# Patient Record
Sex: FEMALE | Employment: FULL TIME | URBAN - METROPOLITAN AREA
[De-identification: names, ages, dates, MRNs, and addresses within clinical notes are randomized per-mention and may not be internally consistent; named-entity substitution may affect disease eponyms.]

---

## 2021-05-13 ENCOUNTER — HOSPITAL ENCOUNTER (OUTPATIENT)
Dept: PHYSICAL THERAPY | Age: 41
Discharge: HOME | End: 2021-05-13
Payer: COMMERCIAL

## 2021-05-13 PROCEDURE — 97162 PT EVAL MOD COMPLEX 30 MIN: CPT

## 2021-05-13 PROCEDURE — 97112 NEUROMUSCULAR REEDUCATION: CPT
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
## 2021-05-18 ENCOUNTER — HOSPITAL ENCOUNTER (OUTPATIENT)
Dept: PHYSICAL THERAPY | Age: 41
Discharge: HOME | End: 2021-05-18
Payer: COMMERCIAL

## 2021-05-18 PROCEDURE — 97110 THERAPEUTIC EXERCISES: CPT

## 2021-05-18 NOTE — PROGRESS NOTES
PHYSICAL THERAPY - DAILY TREATMENT NOTE    Patient Name: Delaney Vuong        Date: 2021  : 1980   yes Patient  Verified  Visit #:   2   of   12  Insurance: Payor: Ish Fernandez / Plan: Chapito Verma RPN / Product Type: Commerical /      In time: 2:45 Out time: 3:33   Total Treatment Time: 48     Medicare/[facility] Time Tracking (below)   Total Timed Codes (min):  n/a 1:1 Treatment Time:  n/a     TREATMENT AREA =  Neck pain [M54.2]  Pain in left shoulder [M25.512]    SUBJECTIVE  Pain Level (on 0 to 10 scale):   10   Medication Changes/New allergies or changes in medical history, any new surgeries or procedures?    no  If yes, update Summary List   Subjective Functional Status/Changes:  []  No changes reported     Patient reports not popping her neck since initial eval. States that she still has difficulty lifting her L arm up over head. OBJECTIVE    48 min Therapeutic Exercise:  [x]  See flow sheet   Rationale:      increase ROM and increase strength to improve the patients ability to perform functional ADLs and household activities with reduced c/o symptoms. Billed With/As:   [x] TE   [] TA   [] Neuro   [] Self Care Patient Education: [x] Review HEP    [] Progressed/Changed HEP based on:   [] positioning   [] body mechanics   [] transfers   [] heat/ice application    [x] other: [employer] Access Code [email or web address]     Other Objective/Functional Measures:    Initiated PT session with UBE followed by standing exercises. Progressed therex per POC (see flowsheet) to improve neck and L shoulder mobility and strength. Req'd cues 100 % of therex for proper form/technique due to 1st F/U appt. Issued additional exercises to HEP to improve spinal mobility and postural mms strength.    Exercises  Seated Thoracic Lumbar Extension with Pectoralis Stretch - 2-3 x daily - 7 x weekly - 15 reps - 5 hold  Seated Scapular Retraction - 1-2 x daily - 5-7 x weekly - 10 reps - 5 hold       Post Treatment Pain Level (on 0 to 10) scale:   2  / 10     ASSESSMENT  Assessment/Changes in Function:     Patient quick to fatigue with strengthening exercises otherwise noted no sharp pain or red flags following PT session. Demo good tolerance with initial f/u indicated by reduced pain levels pre and post tx session     []  See Progress Note/Recertification   Patient will continue to benefit from skilled PT services to modify and progress therapeutic interventions, address functional mobility deficits, address ROM deficits, address strength deficits, analyze and address soft tissue restrictions, analyze and cue movement patterns, analyze and modify body mechanics/ergonomics and assess and modify postural abnormalities to attain remaining goals. Progress toward goals / Updated goals: · Short Term Goals: To be accomplished in  3  weeks:  1. Patient will be independent and compliant with initial HEP. Progressing 05/18; reports HEP compliance  2. Patient will report decreased pain levels to 0/10 in order to sleep through the night pain-free. Progressing 05/18 indicated by pre vs post tx pain levels  3. Demonstrate improved L shoulder PROM to full and pain free in order to prevent exacerbation of symptoms. · Long Term Goals: To be accomplished in  6  weeks:  1. Patient will be independent and compliant with progressive HEP in order to maintain gains made with physical therapy  2. Improve FOTO score from 56 to > or = 64 in order to indicate improved ease with ADLs. 3. Demonstrate improved LUE strength to 4+/5 to improve patients ability to resume cooking and cleaning demands at home. 4. Report ability to [facility] KATIE improved posture for full workday without return of symptoms in order to return to [facility]. 5.       PLAN  [x]  Upgrade activities as tolerated yes Continue plan of care   []  Discharge due to :    []  Other:      Therapist: MARCEL Fields    Date: 5/18/2021 Time: 4:46 PM     Future Appointments   Date Time Provider Luisa Bakeri   5/18/2021  2:45 PM Percy Smart [address]   5/20/2021  3:30 PM Byron Ok,  [facility] [address]   5/25/2021  3:30 PM Byron Ok,  [facility] [address]   5/27/2021  3:30 PM Byron Ok,  [facility] [address]   6/1/2021  3:30 PM Byron Ok,  [facility] [address]   6/3/2021  3:30 PM Byron Ok,  [facility] [address]   6/8/2021  3:30 PM Byron Ok,  [facility] [address]   6/10/2021  3:30 PM Byron Ok,  [facility] [address]   6/15/2021  3:30 PM Byron Ok,  [facility] [address]   6/17/2021  3:30 PM Byron Ok,  [facility] [address]   6/22/2021  3:30 PM Byron Ok,  [facility] [address]   6/24/2021  3:30 PM Byron Ok,  [facility] [address]   6/29/2021  3:30 PM Byron Ok, [address]

## 2021-05-20 ENCOUNTER — HOSPITAL ENCOUNTER (OUTPATIENT)
Dept: PHYSICAL THERAPY | Age: 41
Discharge: HOME | End: 2021-05-20
Payer: COMMERCIAL

## 2021-05-20 PROCEDURE — 97110 THERAPEUTIC EXERCISES: CPT

## 2021-05-20 PROCEDURE — 97112 NEUROMUSCULAR REEDUCATION: CPT

## 2021-05-20 NOTE — PROGRESS NOTES
PHYSICAL THERAPY - DAILY TREATMENT NOTE    Patient Name: Armando Steiner        Date: 2021  : 1980   YES Patient  Verified  Visit #:   3   of   12  Insurance: Payor: Emili Pata / Plan: [employer] [place] RPN / Product Type: Commerical /      In time: 330 Out time: 420   Total Treatment Time: 50     BCBS/Medicare Time Tracking (below)   Total Timed Codes (min):  NA 1:1 Treatment Time:  NA     TREATMENT AREA =  Neck pain [M54.2]  Pain in left shoulder [M25.512]    SUBJECTIVE  Pain Level (on 0 to 10 scale):  3  / 10   Medication Changes/New allergies or changes in medical history, any new surgeries or procedures? NO    If yes, update Summary List   Subjective Functional Status/Changes:  []  No changes reported     I don't have any pain but I feel so stiff and achy after last time. I tried heat and some stretches and they helped but not much            30 min Therapeutic Exercise:  [x]  See flow sheet   Rationale:      increase ROM, increase strength and improve coordination to improve the patients ability to resume OH activity     20 min Neuromuscular Re-ed: [x]  See flow sheet   Rationale:    increase ROM, increase strength, improve coordination and increase proprioception to improve the patients ability to improve resting posture    Billed With/As:   [x] TE   [] TA   [] Neuro   [] Self Care Patient Education: [x] Review HEP    [] Progressed/Changed HEP based on:   [] positioning   [] body mechanics   [] transfers   [] heat/ice application    [] other:      Other Objective/Functional Measures:    TE per FS added multiple exercises for postural control and RC endurance. Post Treatment Pain Level (on 0 to 10) scale:   1  / 10     ASSESSMENT  Assessment/Changes in Function:     Improvements in scapular stability and good tolerance to stretches to cervicothoracic spine. Pt demonstrates improved mobility and ability to achieve therex positions without increase in symptoms.       []  See Progress Note/Recertification   Patient will continue to benefit from skilled PT services to modify and progress therapeutic interventions, address functional mobility deficits, address ROM deficits, address strength deficits, analyze and address soft tissue restrictions, analyze and cue movement patterns, analyze and modify body mechanics/ergonomics and assess and modify postural abnormalities to attain remaining goals. Progress toward goals / Updated goals:    Progressing towards STG #3.      PLAN  [x]  Upgrade activities as tolerated YES Continue plan of care   []  Discharge due to :    []  Other:      Therapist: Wilfred Silva DPT    Date: 5/20/2021 Time: 3:34 PM     Future Appointments   Date Time Provider Luisa Montaño   5/25/2021  3:30 PM Nidia Purl, PT [facility] [address]   5/27/2021  3:30 PM Nidia Purl, PT [facility] [address]   6/1/2021  3:30 PM Nidia Purl, PT [facility] [address]   6/3/2021  3:30 PM Nidia Purl, PT [facility] [address]   6/8/2021  3:30 PM Nidia Purl, PT [facility] [address]   6/10/2021  3:30 PM Nidia Purl, PT [facility] [address]   6/15/2021  3:30 PM Nidia Purl, PT [facility] [address]   6/17/2021  3:30 PM Nidia Purl, PT [facility] [address]   6/22/2021  3:30 PM Nidia Purl, PT [facility] [address]   6/24/2021  3:30 PM Nidia Purl, PT [facility] [address]   6/29/2021  3:30 PM Nidia Purl, [address] [place] [address] [address]

## 2021-05-25 ENCOUNTER — HOSPITAL ENCOUNTER (OUTPATIENT)
Dept: PHYSICAL THERAPY | Age: 41
Discharge: HOME | End: 2021-05-25
Payer: COMMERCIAL

## 2021-05-25 PROCEDURE — 97112 NEUROMUSCULAR REEDUCATION: CPT

## 2021-05-25 PROCEDURE — 97110 THERAPEUTIC EXERCISES: CPT

## 2021-05-25 NOTE — PROGRESS NOTES
PHYSICAL THERAPY - DAILY TREATMENT NOTE    Patient Name: Rodney Patten        Date: 2021  : 1980   YES Patient  Verified  Visit #:      12  Insurance: Payor: Franklyn Freeman / Plan: Darren Vivas RPN / Product Type: Commerical /      In time: 330 Out time: 430   Total Treatment Time: 55     BCBS/Medicare Time Tracking (below)   Total Timed Codes (min):  NA 1:1 Treatment Time:  NA     TREATMENT AREA =  Neck pain [M54.2]  Pain in left shoulder [M25.512]    SUBJECTIVE  Pain Level (on 0 to 10 scale):  3  / 10   Medication Changes/New allergies or changes in medical history, any new surgeries or procedures? NO    If yes, update Summary List   Subjective Functional Status/Changes:  []  No changes reported     I have been feeling so much better/looser. I am sleeping better and am not waking due to pain. Im hurting a little because my dad had surgery yesterday so I was up a lot and lifting            30 min Therapeutic Exercise:  [x]  See flow sheet   Rationale:      increase ROM, increase strength, improve coordination, improve balance and increase proprioception to improve the patients ability to improve resting posture     15 min Neuromuscular Re-ed: [x]  See flow sheet   Rationale:    increase ROM, increase strength, improve coordination and increase proprioception to improve the patients ability to maintain improved posture for prolonged periods. Billed With/As:   [x] TE   [] TA   [] Neuro   [] Self Care Patient Education: [x] Review HEP    [] Progressed/Changed HEP based on:   [] positioning   [] body mechanics   [] transfers   [] heat/ice application    [] other:      Other Objective/Functional Measures:    Resumed Tband rows    Added book openers and dowel flexion/ER to HEP     Post Treatment Pain Level (on 0 to 10) scale:   0  / 10     ASSESSMENT  Assessment/Changes in Function:     Patient demosntrates significant improvements with shoulder and thoracic motion.  Pt states since initiating thoracic mobility she has experienced less headaches and is able to sleep through the night. Reviewed extensively sleeping position in order to avoid prolonged exacerbating positions. []  See Progress Note/Recertification   Patient will continue to benefit from skilled PT services to modify and progress therapeutic interventions, address functional mobility deficits, address ROM deficits, address strength deficits, analyze and address soft tissue restrictions, analyze and cue movement patterns, analyze and modify body mechanics/ergonomics and assess and modify postural abnormalities to attain remaining goals. Progress toward goals / Updated goals:    Progressing towards STG #3.      PLAN  [x]  Upgrade activities as tolerated YES Continue plan of care   []  Discharge due to :    []  Other:      Therapist: Caitlyn Jauregui DPT    Date: 5/25/2021 Time: 3:43 PM     Future Appointments   Date Time Provider Luisa Montaño   5/27/2021  3:30 PM Luz Salguero, PT [facility]   6/1/2021  3:30 PM Luz Salguero PT [facility]   6/3/2021  3:30 PM Luz Salguero PT [facility]   6/7/2021  3:00 PM Grover Jorge [facility]   6/9/2021  3:45 PM [facility]   6/14/2021  3:45 PM [facility]   6/16/2021  3:00 PM Grover Jorge [facility]   6/21/2021  3:[facility]   6/23/2021  3:45 PM [facility]   6/28/2021  3:00 PM Grover Jorge [facility]   6/30/2021  3:45 PM [facility]

## 2021-05-27 ENCOUNTER — HOSPITAL ENCOUNTER (OUTPATIENT)
Dept: PHYSICAL THERAPY | Age: 41
Discharge: HOME | End: 2021-05-27
Payer: COMMERCIAL

## 2021-05-27 PROCEDURE — 97110 THERAPEUTIC EXERCISES: CPT

## 2021-05-27 PROCEDURE — 97112 NEUROMUSCULAR REEDUCATION: CPT

## 2021-05-27 NOTE — PROGRESS NOTES
PHYSICAL THERAPY - DAILY TREATMENT NOTE    Patient Name: Delaney Vuong        Date: 2021  : 1980   YES Patient  Verified  Visit #:      of   12  Insurance: Payor: Ish Fernandez / Plan: Chapito Verma RPN / Product Type: Commerical /      In time: 330 Out time: 420   Total Treatment Time: 50     BCBS/Medicare Time Tracking (below)   Total Timed Codes (min):  50 1:1 Treatment Time:  50     TREATMENT AREA =  Neck pain [M54.2]  Pain in left shoulder [M25.512]    SUBJECTIVE  Pain Level (on 0 to 10 scale):  0  / 10   Medication Changes/New allergies or changes in medical history, any new surgeries or procedures? NO    If yes, update Summary List   Subjective Functional Status/Changes:  []  No changes reported     I feel good today, no pain and I was able to get my hand behind my head with no pain           Modalities Rationale:     decrease edema, decrease inflammation, and decrease pain to improve patient's ability to perform pain-free ADLs.     min [] Estim, type/location:                                      []  att     []  unatt     []  w/US     []  w/ice    []  w/heat    min []  Mechanical Traction: type/lbs                   []  pro   []  sup   []  int   []  cont    []  before manual    []  after manual    min []  Ultrasound, settings/location:      min []  Iontophoresis w/ dexamethasone, location:                                               []  take home patch       []  in clinic   PD min []  Ice     []  Heat    location/position:     min []  Vasopneumatic Device, press/temp:     min []  Other:    [] Skin assessment post-treatment (if applicable):    []  intact    []  redness- no adverse reaction     []redness  adverse reaction:        30 min Therapeutic Exercise:  [x]  See flow sheet   Rationale:      increase ROM, increase strength, improve coordination and improve balance to improve the patients ability to improve resting posture     20 min Neuromuscular Re-ed: [x]  See flow sheet   Rationale: increase ROM, increase strength, improve coordination and improve balance to improve the patients ability to maintain improved posture for prolonged periods. Billed With/As:   [x] TE   [] TA   [] Neuro   [] Self Care Patient Education: [x] Review HEP    [] Progressed/Changed HEP based on:   [] positioning   [] body mechanics   [] transfers   [] heat/ice application    [] other:      Other Objective/Functional Measures:    See POC     Post Treatment Pain Level (on 0 to 10) scale:   0  / 10     ASSESSMENT  Assessment/Changes in Function:     See POC     [x]  See Progress Note/Recertification   Patient will continue to benefit from skilled PT services to modify and progress therapeutic interventions, address functional mobility deficits, address ROM deficits, address strength deficits, analyze and address soft tissue restrictions, analyze and cue movement patterns, analyze and modify body mechanics/ergonomics and assess and modify postural abnormalities to attain remaining goals.    Progress toward goals / Updated goals:    See POC for progress towards goals     PLAN  [x]  Upgrade activities as tolerated YES Continue plan of care   []  Discharge due to :    []  Other:      Therapist: Lisa Hood DPT    Date: 5/27/2021 Time: 5:37 PM     Future Appointments   Date Time Provider Luisa Montaño   6/1/2021  3:30 PM Lon Falcon, PT [facility]   6/3/2021  3:30 PM Lon Falcon, PT [facility]   6/7/2021  3:00 PM Tanvi Dominguez [facility]   6/9/2021  3:45 PM [facility]   6/14/2021  3:45 PM Booker [facility]   6/16/2021  3:00 PM Tanvi Dominguez [facility]   6/21/2021  3:45 PM Booker [facility]   6/23/2021  3:45 PM [facility]   6/28/2021  3:00 PM Tanvi Dominguez [facility]   6/30/2021  3:45 PM Booker Rodriguez [facility]

## 2021-05-27 NOTE — PROGRESS NOTES
[address] [facility] [address] THERAPY   [facility], [address], [place], [address] [facility] - Phone: [phone]  Fax: [phone]  PROGRESS NOTE  Patient Name: Jony Kern : 1980   Treatment/Medical Diagnosis: Neck pain [M54.2]  Pain in left shoulder [M25.512]   Referral Source: Azalia Recio MD     Date of Initial Visit: 5/3/2021 Attended Visits: 5 Missed Visits: 0     SUMMARY OF TREATMENT  Therex including scapular stabilization training, strenghthening of LUE, capsular stretches, neuromuscular control of cervical musculature, AROM and stretching of cervicothoracic spine, postural education, modalities for pain control    CURRENT STATUS  Ms. Oksana Hwang has been seen for 4 follow up visits and is making gradual progress with ROM and pain levels. Although spinal range remains limited, pt is demonstrating significant improvements with postural control and reports dec pain throughout workday and less popping of cervical spine. Shoulder ROM remains limited and painful arc is still present, but patient is able to improve range with cues for correct scapulohumeral rhythm and RC musculature activation. See below for objective measures:    Goal/Measure of Progress Goal Met? 1. Patient will be independent and compliant with initial HEP. Status at last Eval: - Current Status: Independent and compliant 4-5 days/week yes   2. Patient will report decreased pain levels to 0/10 in order to sleep through the night pain-free. Status at last Eval: 0/10 pain currently, and 5/10 pain at worst  Current Status: 0/10 at best  2/10 at worst yes   3. Demonstrate improved L shoulder PROM to full and pain free in order to prevent exacerbation of symptoms. Status at last Eval: 90 deg flex p!,   78 deg abd p!,   fxl IR T10,   fxl ER T1 Current Status: 135 deg flex ,   128 deg abd,   45 deg ER @45 deg abd Progressing     New Goals to be achieved in __3-4__  weeks:  1.   Patient will be independent and compliant with progressive HEP in order to maintain gains made with physical therapy   2. Improve FOTO score from 56 to > or = 64 in order to indicate improved ease with ADLs. 3.  Demonstrate improved LUE strength to 4+/5 to improve patients ability to resume cooking and cleaning demands at home. 4.  Report ability to [facility] KATIE improved posture for full workday without return of symptoms in order to return to PLOF. RECOMMENDATIONS  Continue with skilled Pt services 1-2x/week for 3-4 weeks in order to meet above goals and return to PLOF. If you have any questions/comments please contact us directly at [phone]. Thank you for allowing us to assist in the care of your patient. Therapist Signature: Severiano Generous, DPT Date: 5/27/2021     Time: 5:37 PM   NOTE TO PHYSICIAN:  PLEASE COMPLETE THE ORDERS BELOW AND FAX TO   [facility]: [phone]. If you are unable to process this request in 24 hours please contact our office: [phone].    ___ I have read the above report and request that my patient continue as recommended.   ___ I have read the above report and request that my patient continue therapy with the following changes/special instructions:_________________________________________________________   ___ I have read the above report and request that my patient be discharged from therapy.      Physician Signature:        Date:       Time:

## 2021-06-01 ENCOUNTER — APPOINTMENT (OUTPATIENT)
Dept: PHYSICAL THERAPY | Age: 41
End: 2021-06-01
Payer: COMMERCIAL

## 2021-06-03 ENCOUNTER — APPOINTMENT (OUTPATIENT)
Dept: PHYSICAL THERAPY | Age: 41
End: 2021-06-03
Payer: COMMERCIAL

## 2021-06-07 ENCOUNTER — HOSPITAL ENCOUNTER (OUTPATIENT)
Dept: PHYSICAL THERAPY | Age: 41
Discharge: HOME | End: 2021-06-07
Payer: COMMERCIAL

## 2021-06-07 PROCEDURE — 97110 THERAPEUTIC EXERCISES: CPT

## 2021-06-07 PROCEDURE — 97140 MANUAL THERAPY 1/> REGIONS: CPT

## 2021-06-07 NOTE — PROGRESS NOTES
PHYSICAL THERAPY - DAILY TREATMENT NOTE    Patient Name: Farhad Pump        Date: 2021  : 1980   yes Patient  Verified  Visit #:   6   of   9  Insurance: Payor: Vinnie Ramey / Plan: Cleotha Denver RPN / Product Type: Commerical /      In time: 3:00 Out time: 4:12   Total Treatment Time: 72     Medicare/BCBS Time Tracking (below)   Total Timed Codes (min):  n/a 1:1 Treatment Time:  n/a     TREATMENT AREA =  Neck pain [M54.2]  Pain in left shoulder [M25.512]    SUBJECTIVE  Pain Level (on 0 to 10 scale): 6 / 10   Medication Changes/New allergies or changes in medical history, any new surgeries or procedures?    no  If yes, update Summary List   Subjective Functional Status/Changes:  []  No changes reported     Patient reports trying to put a towel around her body and ended up hearing a \"pop\" in the anterior shoulder. Demonstrating excessive horizontal abduction. OBJECTIVE    Modalities Rationale:     decrease inflammation and decrease pain to improve patient's c/o muscle soreness.    min [] Estim, type/location:                                      []  att     []  unatt     []  w/US     []  w/ice    []  w/heat    min []  Mechanical Traction: type/lbs                   []  pro   []  sup   []  int   []  cont    []  before manual    []  after manual    min []  Ultrasound, settings/location:      min []  Iontophoresis w/ dexamethasone, location:                                               []  take home patch       []  in clinic   10 Min [x]  Ice     []  Heat    Location/position: To L shoulder/neck in supine post-session    min []  Vasopneumatic Device, press/temp:    If using vaso (only need to measure limb vaso being performed on)      pre-treatment girth :       post-treatment girth :       measured at (landmark location) :      min []  Other:    [x] Skin assessment post-treatment (if applicable):    [x]  intact    []  redness- no adverse reaction                  []redness  adverse reaction:        47 min Therapeutic Exercise:  [x]  See flow sheet   Rationale:      increase ROM and increase strength to improve the patients ability to perform functional ADLs and household activities with reduced c/o symptoms. 15 min Manual Therapy: STM/MFR to L ant/post capsule, L pec; L GHJ post/inf mobs in supine   Rationale:      decrease pain, increase ROM, increase tissue extensibility and decrease trigger points to improve patient's ability to perform functional ADLs   The manual therapy interventions were performed at a separate and distinct time from the therapeutic activities interventions. Billed With/As:   [x] TE   [] TA   [] Neuro   [] Self Care Patient Education: [x] Review HEP    [] Progressed/Changed HEP based on:   [] positioning   [] body mechanics   [] transfers   [] heat/ice application    [x] other: [employer] Access Code [facility]     Other Objective/Functional Measures:    *educated pt on use of Theracane and patient return demonstrated technique. *increase tenderness along LHB and anterior capsule. Mild TrPs in post cuff. Post Treatment Pain Level (on 0 to 10) scale:   0  / 10     ASSESSMENT  Assessment/Changes in Function:     Patient noted no pain following PT session. Patient's c/o pain along with subjective statement, patient could have strained anterior shoulder. No significant sign of injury. Would continue to benefit from skilled PT to continue to improve L shoulder strength and mobility     []  See Progress Note/Recertification   Patient will continue to benefit from skilled PT services to modify and progress therapeutic interventions, address functional mobility deficits, address ROM deficits, address strength deficits, analyze and address soft tissue restrictions, analyze and cue movement patterns, analyze and modify body mechanics/ergonomics and assess and modify postural abnormalities to attain remaining goals.    Progress toward goals / Updated goals:    New Goals to be achieved in __3-4__  weeks:  1. Patient will be independent and compliant with progressive HEP in order to maintain gains made with physical therapy   2. Improve FOTO score from 56 to > or = 64 in order to indicate improved ease with ADLs. 3.  Demonstrate improved LUE strength to 4+/5 to improve patients ability to resume cooking and cleaning demands at home.    4.  Report ability to maintian improved posture for full workday without return of symptoms in order to return to [facility].      No significant progress towards PT goals     PLAN  [x]  Upgrade activities as tolerated yes Continue plan of care   []  Discharge due to :    []  Other:      Therapist: Gustavo Moss    Date: 6/7/2021 Time: 4:44 PM     Future Appointments   Date Time Provider Luisa Montaño   6/7/2021  3:00 PM Lorice Bjornstad [facility]   6/9/2021  3:45 PM Lorice Bjornstad [facility]   6/14/2021  3:45 PM Lorice Bjornstad [facility]   6/16/2021  3:00 PM [facility]   6/21/2021  3:45 PM [facility]   6/23/2021  3:45 PM Lorice Bjornstad [facility]   6/28/2021  3:00 PM Lorice Bjornstad [facility]   6/30/2021  3:45 PM Bari Short

## 2021-06-08 ENCOUNTER — APPOINTMENT (OUTPATIENT)
Dept: PHYSICAL THERAPY | Age: 41
End: 2021-06-08
Payer: COMMERCIAL

## 2021-06-09 ENCOUNTER — HOSPITAL ENCOUNTER (OUTPATIENT)
Dept: PHYSICAL THERAPY | Age: 41
Discharge: HOME | End: 2021-06-09
Payer: COMMERCIAL

## 2021-06-09 PROCEDURE — 97110 THERAPEUTIC EXERCISES: CPT

## 2021-06-09 PROCEDURE — 97530 THERAPEUTIC ACTIVITIES: CPT

## 2021-06-09 NOTE — PROGRESS NOTES
PHYSICAL THERAPY - DAILY TREATMENT NOTE    Patient Name: Savanna Garcia        Date: 2021  : 1980   yes Patient  Verified  Visit #:     Insurance: Payor: Faraz De Jesus / Plan: Leonides Meckel RPN / Product Type: Commerical /      In time: 3:44 Out time: 4:44   Total Treatment Time: 60     Medicare/BCBS Time Tracking (below)   Total Timed Codes (min):  n/a 1:1 Treatment Time:  n/a     TREATMENT AREA =  Neck pain [M54.2]  Pain in left shoulder [M25.512]    SUBJECTIVE  Pain Level (on 0 to 10 scale): 3 / 10   Medication Changes/New allergies or changes in medical history, any new surgeries or procedures?    no  If yes, update Summary List   Subjective Functional Status/Changes:  []  No changes reported     Patient reports not feeling too bad today. States that she feels better than LV        OBJECTIVE    Modalities Rationale:     decrease inflammation and decrease pain to improve patient's c/o muscle soreness.    min [] Estim, type/location:                                      []  att     []  unatt     []  w/US     []  w/ice    []  w/heat    min []  Mechanical Traction: type/lbs                   []  pro   []  sup   []  int   []  cont    []  before manual    []  after manual    min []  Ultrasound, settings/location:      min []  Iontophoresis w/ dexamethasone, location:                                               []  take home patch       []  in clinic   10 Min [x]  Ice     []  Heat    Location/position: To L shoulder/neck in supine post-session    min []  Vasopneumatic Device, press/temp:    If using vaso (only need to measure limb vaso being performed on)      pre-treatment girth :       post-treatment girth :       measured at (landmark location) :      min []  Other:    [x] Skin assessment post-treatment (if applicable):    [x]  intact    []  redness- no adverse reaction                  []redness  adverse reaction:        35 min Therapeutic Exercise:  [x]  See flow sheet   Rationale:      increase ROM and increase strength to improve the patients ability to perform functional ADLs and household activities with reduced c/o symptoms. 15 min Therapeutic Activity: [x]  See flow sheet   Rationale:    increase strength and improve coordination to improve the patients ability to tolerate lifting/carrying activities with decrease c/o symptoms. Billed With/As:   [x] TE   [] TA   [] Neuro   [] Self Care Patient Education: [x] Review HEP    [] Progressed/Changed HEP based on:   [] positioning   [] body mechanics   [] transfers   [] heat/ice application    [x] other: "[employer] Access Code [facility]     Other Objective/Functional Measures:    Access Code: [email or web address]  URL: [email or web address]  Date: 06/09/2021  Prepared by: Santy Wilder    Program Notes  PERFORM ALL EXERCISES TO YOUR TOLERANCE. IF SHARP PAIN  OR RED FLAGS OCCUR, IMMEDIATELY STOP EXERCISE. Exercises  Sidelying Shoulder External Rotation - 1 x daily - 4-5 x weekly - 2 sets - 10 reps  Sidelying Shoulder Abduction - 1 x daily - 4-5 x weekly - 2 sets - 10 reps  Quadruped Alternating Arm Lift - 1 x daily - 4-5 x weekly - 3 sets - 5 reps  Plank on Counter - 1 x daily - 4-5 x weekly - 5 reps - 10 hold    *added multiple shoulder/scapular strengthening exercises (see flowsheet)     Post Treatment Pain Level (on 0 to 10) scale:   0  / 10     ASSESSMENT  Assessment/Changes in Function:     Patient noted no pain following PT session. Able to maintain good shoulder positioning during PREs and noted no discomfort in the L shoulder.       []  See Progress Note/Recertification   Patient will continue to benefit from skilled PT services to modify and progress therapeutic interventions, address functional mobility deficits, address ROM deficits, address strength deficits, analyze and address soft tissue restrictions, analyze and cue movement patterns, analyze and modify body mechanics/ergonomics and assess and modify postural abnormalities to attain remaining goals. Progress toward goals / Updated goals:    New Goals to be achieved in __3-4__  weeks:  1. Patient will be independent and compliant with progressive HEP in order to maintain gains made with physical therapy   2. Improve FOTO score from 56 to > or = 64 in order to indicate improved ease with ADLs. 3.  Demonstrate improved LUE strength to 4+/5 to improve patients ability to resume cooking and cleaning demands at home. Progressing 06/09 indicated by ability to complete PREs   4.   Report ability to maintian improved posture for full workday without return of symptoms in order to return to PLOF.           PLAN  [x]  Upgrade activities as tolerated yes Continue plan of care   []  Discharge due to :    []  Other:      Therapist: MARCEL Johnson    Date: 6/9/2021 Time: 515 PM     Future Appointments   Date Time Provider Luisa Montaño   6/9/2021  3:45 PM [facility] [address]   6/14/2021  3:45 PM [facility] [address]   6/16/2021  3:00 PM [facility] [address]   6/21/2021  3:45 PM [facility] [address]   6/23/2021  3:45 PM [facility] [address]   6/28/2021  3:00 PM [facility] [address]   6/30/2021  3:45 PM Delia Thurman

## 2021-06-10 ENCOUNTER — APPOINTMENT (OUTPATIENT)
Dept: PHYSICAL THERAPY | Age: 41
End: 2021-06-10
Payer: COMMERCIAL

## 2021-06-14 ENCOUNTER — HOSPITAL ENCOUNTER (OUTPATIENT)
Dept: PHYSICAL THERAPY | Age: 41
Discharge: HOME | End: 2021-06-14
Payer: COMMERCIAL

## 2021-06-14 PROCEDURE — 97530 THERAPEUTIC ACTIVITIES: CPT

## 2021-06-14 PROCEDURE — 97110 THERAPEUTIC EXERCISES: CPT

## 2021-06-14 NOTE — PROGRESS NOTES
PHYSICAL THERAPY - DAILY TREATMENT NOTE    Patient Name: Kelley Delay        Date: 2021  : 1980   yes Patient  Verified  Visit #:      of     Insurance: Payor: Veronica  / Plan: Yonathananabella Bestger RPN / Product Type: Commerical /      In time: 3:48 Out time: 4:56   Total Treatment Time: 68     Medicare/[facility] Time Tracking (below)   Total Timed Codes (min):  n/a 1:1 Treatment Time:  n/a     TREATMENT AREA =  Neck pain [M54.2]  Pain in left shoulder [M25.512]    SUBJECTIVE  Pain Level (on 0 to 10 scale): 2  10   Medication Changes/New allergies or changes in medical history, any new surgeries or procedures?    no  If yes, update Summary List   Subjective Functional Status/Changes:  []  No changes reported     Patient reports being able to reach up overhead and reach out to the side without significant pain. Still feels very weak trying to lift the R arm up. OBJECTIVE  Modalities Rationale:     decrease inflammation and decrease pain to improve patient's c/o muscle soreness.    min [] Estim, type/location:                                      []  att     []  unatt     []  w/US     []  w/ice    []  w/heat    min []  Mechanical Traction: type/lbs                   []  pro   []  sup   []  int   []  cont    []  before manual    []  after manual    min []  Ultrasound, settings/location:      min []  Iontophoresis w/ dexamethasone, location:                                               []  take home patch       []  in clinic   10 Min [x]  Ice     []  Heat    Location/position: To L shoulder/neck in supine post-session    min []  Vasopneumatic Device, press/temp:    If using vaso (only need to measure limb vaso being performed on)      pre-treatment girth :       post-treatment girth :       measured at (landmark location) :      min []  Other:    [x] Skin assessment post-treatment (if applicable):    [x]  intact    []  redness- no adverse reaction                  []redness  adverse reaction:      18 min Therapeutic Exercise:  [x]  See flow sheet   Rationale:      increase ROM and increase strength to improve the patients ability to perform functional ADLs and household activities with reduced c/o symptoms. 40 min Therapeutic Activity: [x]  See flow sheet   Rationale:    increase strength and improve coordination to improve the patients ability to tolerate lifting/carrying activities with decrease c/o symptoms. Billed With/As:   [x] TE   [] TA   [] Neuro   [] Self Care Patient Education: [x] Review HEP    [] Progressed/Changed HEP based on:   [] positioning   [] body mechanics   [] transfers   [] heat/ice application    [x] other: [employer] Access Code [email or web address]     Other Objective/Functional Measures:    *progressed exercises to increase overhead strength (see flowsheet)   *cues for scapular activation to engage correct musculature during carrying and lifting activities. Post Treatment Pain Level (on 0 to 10) scale:  0  / 10     ASSESSMENT  Assessment/Changes in Function:     Patient was quick to fatigue during PREs, however denied sharp pain or red flags during exercises. []  See Progress Note/Recertification   Patient will continue to benefit from skilled PT services to modify and progress therapeutic interventions, address functional mobility deficits, address ROM deficits, address strength deficits, analyze and address soft tissue restrictions, analyze and cue movement patterns, analyze and modify body mechanics/ergonomics and assess and modify postural abnormalities to attain remaining goals. Progress toward goals / Updated goals:    New Goals to be achieved in __3-4__  weeks:  1. Patient will be independent and compliant with progressive HEP in order to maintain gains made with physical therapy   2. Improve FOTO score from 56 to > or = 64 in order to indicate improved ease with ADLs.    3.  Demonstrate improved LUE strength to 4+/5 to improve patients ability to resume cooking and cleaning demands at home. Progressing 06/09 indicated by ability to complete PREs   4.   Report ability to maintian improved posture for full workday without return of symptoms in order to return to [facility].           PLAN  [x]  Upgrade activities as tolerated yes Continue plan of care   []  Discharge due to :    []  Other:      Therapist: Allan Oh    Date: 6/14/2021 Time: 5:13 PM     Future Appointments   Date Time Provider Luisa Montaño   6/14/2021  3:45 PM [facility]   6/16/2021  3:00 PM Margaree Newport [facility]   6/21/2021  3:45 PM Terra Pizano [address] [place] [address] [facility]   6/23/2021  3:45 PM [facility]   6/28/2021  3:00 PM Margaree Newport [facility]   6/30/2021  3:45 PM Yoon Arita

## 2021-06-15 ENCOUNTER — APPOINTMENT (OUTPATIENT)
Dept: PHYSICAL THERAPY | Age: 41
End: 2021-06-15
Payer: COMMERCIAL

## 2021-06-16 ENCOUNTER — HOSPITAL ENCOUNTER (OUTPATIENT)
Dept: PHYSICAL THERAPY | Age: 41
Discharge: HOME | End: 2021-06-16
Payer: COMMERCIAL

## 2021-06-16 PROCEDURE — 97110 THERAPEUTIC EXERCISES: CPT

## 2021-06-16 PROCEDURE — 97530 THERAPEUTIC ACTIVITIES: CPT

## 2021-06-16 NOTE — PROGRESS NOTES
PHYSICAL THERAPY - DAILY TREATMENT NOTE    Patient Name: Gene Ji        Date: 2021  : 1980   yes Patient  Verified  Visit #:     Insurance: Payor: Renetta Cardenas / Plan: Ashley CADET / Product Type: Commerical /      In time: 3:02 Out time: 3:58   Total Treatment Time: 56     Medicare/[employer] Time Tracking (below)   Total Timed Codes (min):  n/a 1:1 Treatment Time:  n/a     TREATMENT AREA =  Neck pain [M54.2]  Pain in left shoulder [M25.512]    SUBJECTIVE  Pain Level (on 0 to 10 scale): 1 / 10   Medication Changes/New allergies or changes in medical history, any new surgeries or procedures?    no  If yes, update Summary List   Subjective Functional Status/Changes:  []  No changes reported     Patient reports unknowingly bringing her L arm behind her head and not having pain. States that she been able to press herself up out of bed. OBJECTIVE  Modalities Rationale:     decrease inflammation and decrease pain to improve patient's c/o muscle soreness.    min [] Estim, type/location:                                      []  att     []  unatt     []  w/US     []  w/ice    []  w/heat    min []  Mechanical Traction: type/lbs                   []  pro   []  sup   []  int   []  cont    []  before manual    []  after manual    min []  Ultrasound, settings/location:      min []  Iontophoresis w/ dexamethasone, location:                                               []  take home patch       []  in clinic   10 Min [x]  Ice     []  Heat    Location/position: To L shoulder/neck in supine post-session    min []  Vasopneumatic Device, press/temp:    If using vaso (only need to measure limb vaso being performed on)      pre-treatment girth :       post-treatment girth :       measured at (landmark location) :      min []  Other:    [x] Skin assessment post-treatment (if applicable):    [x]  intact    []  redness- no adverse reaction                  []redness  adverse reaction:      30 min Therapeutic Exercise:  [x]  See flow sheet   Rationale:      increase ROM and increase strength to improve the patients ability to perform functional ADLs and household activities with reduced c/o symptoms. 16 min Therapeutic Activity: [x]  See flow sheet   Rationale:    increase strength and improve coordination to improve the patients ability to tolerate lifting/carrying activities with decrease c/o symptoms. Billed With/As:   [x] TE   [] TA   [] Neuro   [] Self Care Patient Education: [x] Review HEP    [] Progressed/Changed HEP based on:   [] positioning   [] body mechanics   [] transfers   [] heat/ice application    [x] other: [employer] Access Code [email or web address]     Other Objective/Functional Measures:    *progressed exercises to improve scapular and shoulder strength (see flowsheet)   *noted slight SAS pain with S/L T/S rotation with full elbow extension     Post Treatment Pain Level (on 0 to 10) scale:  0 / 10     ASSESSMENT  Assessment/Changes in Function:     Patient noted no pain following PT session. Patient demonstrating good progress thus far and will be appropriate to transition to D/C to home with progressive, long-term HEP within the next 2-3 visits. []  See Progress Note/Recertification   Patient will continue to benefit from skilled PT services to modify and progress therapeutic interventions, address functional mobility deficits, address ROM deficits, address strength deficits, analyze and address soft tissue restrictions, analyze and cue movement patterns, analyze and modify body mechanics/ergonomics and assess and modify postural abnormalities to attain remaining goals. Progress toward goals / Updated goals:    New Goals to be achieved in __3-4__  weeks:  1. Patient will be independent and compliant with progressive HEP in order to maintain gains made with physical therapy   2. Improve FOTO score from 56 to > or = 64 in order to indicate improved ease with ADLs.    3.  Demonstrate improved LUE strength to 4+/5 to improve patients ability to resume cooking and cleaning demands at home. Progressing 06/09 indicated by ability to complete PREs   4.   Report ability to maintain improved posture for full workday without return of symptoms in order to return to [facility] 06/16; able           PLAN  [x]  Upgrade activities as tolerated yes Continue plan of care   []  Discharge due to :    []  Other:      Therapist: Gustavo Moss    Date: 6/16/2021 Time: 4:30 PM     Future Appointments   Date Time Provider Luisa Montaño   6/16/2021  3:00 PM Nazario [facility]   6/21/2021  3:45 PM Nazario [facility]   6/23/2021  3:45 PM Nazario [facility]   6/28/2021  3:00 PM Nazario [facility]   6/30/2021  3:45 PM Bari Short

## 2021-06-17 ENCOUNTER — APPOINTMENT (OUTPATIENT)
Dept: PHYSICAL THERAPY | Age: 41
End: 2021-06-17
Payer: COMMERCIAL

## 2021-06-22 ENCOUNTER — APPOINTMENT (OUTPATIENT)
Dept: PHYSICAL THERAPY | Age: 41
End: 2021-06-22
Payer: COMMERCIAL

## 2021-06-23 ENCOUNTER — HOSPITAL ENCOUNTER (OUTPATIENT)
Dept: PHYSICAL THERAPY | Age: 41
Discharge: HOME | End: 2021-06-23
Payer: COMMERCIAL

## 2021-06-23 PROCEDURE — 97535 SELF CARE MNGMENT TRAINING: CPT

## 2021-06-23 NOTE — PROGRESS NOTES
[address] [place] [address] [id number] [place] [address] PHYSICAL THERAPY  [address] [facility], [place], [address] [facility] - Phone: [phone]  Fax: [phone]  DISCHARGE SUMMARY  Patient Name: Jorgito Barrett : 1980   Treatment/Medical Diagnosis: Neck pain [M54.2]  Pain in left shoulder [M25.512]   Referral Source: Candi Farfan MD     Date of Initial Visit: 21 Attended Visits: 10 Missed Visits: 1     SUMMARY OF TREATMENT  Patient has attended 10 PT sessions, including an initial evaluation for L shoulder and neck pain. PT interventions have included manual therapy (STM/MFR and GHJ mobilizations), therapeutic exercises including scapular stabilization training, strenghthening of LUE, capsular stretches, neuromuscular control of cervical musculature, AROM and stretching of cervicothoracic spine, patient education (postural awareness and body mechanics), modalities for pain control. CURRENT STATUS  Patient has made good gains with L shoulder ROM, however pt's strength is limited due to pain. In the last 2 weeks, pain has ranged between 0-7/10. Notes sharp pain with pushing activities (ie: pushing herself up out of bed). Pain primarily located in the posterior shoulder. Patient also has complaint of \"constant popping feeling\" in the SAS with reaching activities. Current objective findings: L shoulder AROM: flexion 125 deg, abduction 100 deg, FIR to T7, PINKY thumb to C7; increase tenderness and tightness in SAS, LHB, posterior cuff, and subscapularis; special tests: (+) Neers, Empty Can, painful arc, Kandice Lacy and (-) drop arm test, lift off test  Patient is now discharged from physical therapy due to limited insurance visits and lack of appreciable progress towards PT goals. Patient has been advised to follow up with your office for further assessment of L shoulder symptoms. Goal/Measure of Progress Goal Met? 1.   Patient will be independent and compliant with progressive HEP in order to maintain gains made with physical therapy   Status at last Eval: Progressive HEP issued Current Status: I and compliant with progressive HEP yes   2. Improve FOTO score from 56 to > or = 64 in order to indicate improved ease with ADLs. Status at last Eval: 56/100 Current Status: 56/100 no   3. Demonstrate improved LUE strength to 4+/5 to improve patients ability to resume cooking and cleaning demands at home. Status at last Eval: 3+/5 flx/abd/ER all with p!, 4+/5 IR Current Status: 3+/5 in all directions. Pain with resisted flexion, abd, IR no     4. Report ability to maintain improved posture for full workday without return of symptoms in order to return to PLOF.    Status at last Eval: unable Current Status: 5-6 hours before onset of pain no     RECOMMENDATIONS  Other: Discharge therapy due to limited insurance visits and lack of appreciable progress towards PT goals. Thank you for this referral.     If you have any questions/comments please contact us directly at [phone]. Thank you for allowing us to assist in the care of your patient.     Therapist Signature: Mario Donnelly [place] Date: 06/29/21    Analilia Cedeno PT, DPT   Time: 10:40 AM     Caitlyn Morgan MD

## 2021-06-23 NOTE — PROGRESS NOTES
PHYSICAL THERAPY - DAILY TREATMENT NOTE    Patient Name: Sebastian Stinson        Date: 2021  : 1980   yes Patient  Verified  Visit #:   10   of   13  Insurance: Payor: Nakia Jackson / Plan: Corby Jon RPN / Product Type: Commerical /      In time: 3:45 Out time: 4:28   Total Treatment Time: 43     Medicare/[facility] Time Tracking (below)   Total Timed Codes (min):  n/a 1:1 Treatment Time:  n/a     TREATMENT AREA =  Neck pain [M54.2]  Pain in left shoulder [M25.512]    SUBJECTIVE  Pain Level (on 0 to 10 scale): 0 / 10   Medication Changes/New allergies or changes in medical history, any new surgeries or procedures?    no  If yes, update Summary List   Subjective Functional Status/Changes:  []  No changes reported     SEE D/C       OBJECTIVE    43 min Self Care: Reassessment. Reviewed HEP. Discussed diagnosis and following up with MD.    Rationale:  to improve understanding of current diagnosis and discharge instructions. Billed With/As:   [] TE   [] TA   [] Neuro   [x] Self Care Patient Education: [x] Review HEP    [] Progressed/Changed HEP based on:   [] positioning   [] body mechanics   [] transfers   [] heat/ice application    [x] other: [employer] Access Code [email or web address]     Other Objective/Functional Measures:    SEE D/C   Post Treatment Pain Level (on 0 to 10) scale:  0/ 10     ASSESSMENT  Assessment/Changes in Function:     SEE D/C     []  See Progress Note/Recertification   Patient will continue to benefit from skilled PT services to modify and progress therapeutic interventions, address functional mobility deficits, address ROM deficits, address strength deficits, analyze and address soft tissue restrictions, analyze and cue movement patterns, analyze and modify body mechanics/ergonomics and assess and modify postural abnormalities to attain remaining goals.    Progress toward goals / Updated goals:  SEE D/C     PLAN  []  Upgrade activities as tolerated no Continue plan of care   [x]  Discharge due to : Completed PT program; progressing towards PT goals; limited insurance visits   []  Other:      Therapist: MARCEL Li    Date: 6/23/2021 Time: 6:07 PM     Future Appointments   Date Time Provider Luisa Montaño   6/23/2021  3:45 PM Yoko Flores [facility]   6/28/2021  3:00 PM Allan Campbell [facility]   6/30/2021  3:45 PM Yoko Flores [facility]

## 2021-06-24 ENCOUNTER — APPOINTMENT (OUTPATIENT)
Dept: PHYSICAL THERAPY | Age: 41
End: 2021-06-24
Payer: COMMERCIAL

## 2021-06-28 ENCOUNTER — APPOINTMENT (OUTPATIENT)
Dept: PHYSICAL THERAPY | Age: 41
End: 2021-06-28
Payer: COMMERCIAL

## 2021-06-29 ENCOUNTER — APPOINTMENT (OUTPATIENT)
Dept: PHYSICAL THERAPY | Age: 41
End: 2021-06-29
Payer: COMMERCIAL

## 2021-06-30 ENCOUNTER — APPOINTMENT (OUTPATIENT)
Dept: PHYSICAL THERAPY | Age: 41
End: 2021-06-30
Payer: COMMERCIAL

## 2021-07-07 ENCOUNTER — APPOINTMENT (OUTPATIENT)
Dept: PHYSICAL THERAPY | Age: 41
End: 2021-07-07

## 2021-07-09 ENCOUNTER — APPOINTMENT (OUTPATIENT)
Dept: PHYSICAL THERAPY | Age: 41
End: 2021-07-09